# Patient Record
Sex: MALE | URBAN - NONMETROPOLITAN AREA
[De-identification: names, ages, dates, MRNs, and addresses within clinical notes are randomized per-mention and may not be internally consistent; named-entity substitution may affect disease eponyms.]

---

## 2018-08-23 ENCOUNTER — OFFICE VISIT (OUTPATIENT)
Dept: URBAN - NONMETROPOLITAN AREA CLINIC 9 | Facility: CLINIC | Age: 43
End: 2018-08-23
Payer: MEDICAID

## 2018-08-23 DIAGNOSIS — H10.45 OTHER CHRONIC ALLERGIC CONJUNCTIVITIS: Chronic | ICD-10-CM

## 2018-08-23 DIAGNOSIS — H04.123 DRY EYE SYNDROME OF BILATERAL LACRIMAL GLANDS: Primary | Chronic | ICD-10-CM

## 2018-08-23 PROCEDURE — 92004 COMPRE OPH EXAM NEW PT 1/>: CPT | Performed by: OPTOMETRIST

## 2018-08-23 RX ORDER — PREDNISOLONE ACETATE 10 MG/ML
1 % SUSPENSION/ DROPS OPHTHALMIC
Qty: 1 | Refills: 0 | Status: INACTIVE
Start: 2018-08-23 | End: 2018-08-29

## 2018-08-23 RX ORDER — KETOTIFEN FUMARATE 0.35 MG/ML
SOLUTION/ DROPS OPHTHALMIC
Qty: 1 | Refills: 11 | Status: INACTIVE
Start: 2018-08-23 | End: 2021-05-10

## 2018-08-23 ASSESSMENT — INTRAOCULAR PRESSURE
OS: 19
OD: 19

## 2018-08-23 NOTE — IMPRESSION/PLAN
Impression: Other chronic allergic conjunctivitis: H10.45. Plan: Patient educated that symptoms are caused by ocular allergies and that treatment will help alleviate symptoms but that it will not prevent allergies. Patient educated that allergy testing with an allergy specialist may be necessary to identify allergens affecting patient and treat condition. Prescribed Prednisolone acetate 1% QID OU x 7-10 days, then start azelastine or OTC [place] BID OU for ocular itch.

## 2021-05-10 ENCOUNTER — OFFICE VISIT (OUTPATIENT)
Dept: URBAN - METROPOLITAN AREA CLINIC 60 | Facility: CLINIC | Age: 46
End: 2021-05-10
Payer: COMMERCIAL

## 2021-05-10 DIAGNOSIS — H01.8 OTHER SPECIFIED INFLAMMATIONS OF EYELID: ICD-10-CM

## 2021-05-10 DIAGNOSIS — G43.111 MIGRAINE WITH AURA, INTRACTABLE, WITH STATUS MIGRAINOSUS: Primary | ICD-10-CM

## 2021-05-10 PROCEDURE — 92004 COMPRE OPH EXAM NEW PT 1/>: CPT | Performed by: OPHTHALMOLOGY

## 2021-05-10 RX ORDER — BACITRACIN 500 [USP'U]/G
OINTMENT OPHTHALMIC
Qty: 3.5 | Refills: 3 | Status: ACTIVE
Start: 2021-05-10

## 2021-05-10 ASSESSMENT — KERATOMETRY
OD: 45.12
OS: 45.12

## 2021-05-10 ASSESSMENT — INTRAOCULAR PRESSURE
OD: 16
OS: 16

## 2021-05-10 ASSESSMENT — VISUAL ACUITY
OD: 20/30
OS: 20/25

## 2021-05-10 NOTE — IMPRESSION/PLAN
Impression: Migraine with aura, intractable, with status migrainosus: G43.111. Plan: Evaluate with PCP for migraine and hypertension.

## 2022-08-02 ENCOUNTER — OFFICE VISIT (OUTPATIENT)
Dept: URBAN - METROPOLITAN AREA CLINIC 60 | Facility: CLINIC | Age: 47
End: 2022-08-02
Payer: COMMERCIAL

## 2022-08-02 DIAGNOSIS — S09.93XA INJURY OF FACE, INITIAL ENCOUNTER: ICD-10-CM

## 2022-08-02 DIAGNOSIS — H53.2 DIPLOPIA: Primary | ICD-10-CM

## 2022-08-02 PROCEDURE — 92004 COMPRE OPH EXAM NEW PT 1/>: CPT | Performed by: OPTOMETRIST

## 2022-08-02 ASSESSMENT — INTRAOCULAR PRESSURE
OD: 20
OS: 23

## 2022-08-02 ASSESSMENT — VISUAL ACUITY
OS: 20/30
OD: 20/30
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
## 2022-08-02 NOTE — IMPRESSION/PLAN
Impression: Diplopia: H53.2. Plan: No strabismus or restrictions noted  on EOMs. Patient states double vision is intermittent and not sure if double vision is in one eye or both. Discussed EOM restrictions secondary to orbital fracture but not noted today. Discussed uncorrected astigmatism and how it can cause diplopia/ghosting images.  Recommend patient try [facility] for glasses Rx (on [facility]) and monitor double vision(check eyes together verses individually)